# Patient Record
Sex: FEMALE | Race: OTHER | HISPANIC OR LATINO
[De-identification: names, ages, dates, MRNs, and addresses within clinical notes are randomized per-mention and may not be internally consistent; named-entity substitution may affect disease eponyms.]

---

## 2019-08-22 PROBLEM — Z00.00 ENCOUNTER FOR PREVENTIVE HEALTH EXAMINATION: Status: ACTIVE | Noted: 2019-08-22

## 2019-08-30 ENCOUNTER — TRANSCRIPTION ENCOUNTER (OUTPATIENT)
Age: 24
End: 2019-08-30

## 2019-08-30 ENCOUNTER — APPOINTMENT (OUTPATIENT)
Dept: GYNECOLOGIC ONCOLOGY | Facility: CLINIC | Age: 24
End: 2019-08-30
Payer: COMMERCIAL

## 2019-08-30 VITALS
SYSTOLIC BLOOD PRESSURE: 108 MMHG | WEIGHT: 110 LBS | HEIGHT: 61 IN | DIASTOLIC BLOOD PRESSURE: 72 MMHG | BODY MASS INDEX: 20.77 KG/M2

## 2019-08-30 DIAGNOSIS — Z86.19 PERSONAL HISTORY OF OTHER INFECTIOUS AND PARASITIC DISEASES: ICD-10-CM

## 2019-08-30 DIAGNOSIS — Z78.9 OTHER SPECIFIED HEALTH STATUS: ICD-10-CM

## 2019-08-30 DIAGNOSIS — L29.2 PRURITUS VULVAE: ICD-10-CM

## 2019-08-30 DIAGNOSIS — F12.90 CANNABIS USE, UNSPECIFIED, UNCOMPLICATED: ICD-10-CM

## 2019-08-30 PROCEDURE — 99204 OFFICE O/P NEW MOD 45 MIN: CPT

## 2019-08-30 RX ORDER — CLOTRIMAZOLE AND BETAMETHASONE DIPROPIONATE 10; .5 MG/G; MG/G
1-0.05 CREAM TOPICAL TWICE DAILY
Qty: 1 | Refills: 0 | Status: ACTIVE | COMMUNITY
Start: 2019-08-30 | End: 1900-01-01

## 2019-08-30 RX ORDER — TERCONAZOLE 80 MG/1
80 SUPPOSITORY VAGINAL
Qty: 3 | Refills: 0 | Status: ACTIVE | COMMUNITY
Start: 2019-08-30 | End: 1900-01-01

## 2019-08-30 NOTE — CHIEF COMPLAINT
[FreeTextEntry1] : 25 y/o referred from Dr. Vizcarra c/o vulvar pruritis x 2 months, the area around introitos/hymen, described as pale to white. Symptoms began in June. Initially with abnormal discharge, no longer has discharge. Pt is sexually active, using condoms but has not been having any intercourse since June. Pt denies any hx of new soaps/creams at home. \par \par Antifungal steroid creams were not effective. STD work up negative. \par \par GYNHx: none\par OBHx: none\par PMHx:none\par PsHx:none\par Meds:none\par SocialHx: Research Assistant, Single. Drinks ETOH 1-2x/week. Marijuana use (very little, socially). Denies Cigarette use. \par FmHx:Denies hx of CA. \par \par Health maintenance:\par 1. PAP August 2019; normal HPV-

## 2019-08-30 NOTE — PAST MEDICAL HISTORY
[Menarche Age ____] : age at menarche was [unfilled] [Definite ___ (Date)] : the last menstrual period was [unfilled] [Normal Amount/Duration] : it was of a normal amount and duration [Condoms] : uses condoms

## 2019-08-30 NOTE — ASSESSMENT
[FreeTextEntry1] : 24 w/ hypopigmented area to vulvar c/o vulvar pruritis x 2 months. Pruritis associated with the end of her menses and intercourse. Thick candidal like discharge noted today. \par \par My clinically impression is that this is a persistent vaginal candidiasis. Recommend topical treatments for both treatment and symptomatic relief. She may require treatment for a resistant or chronic infection. If no improvement after treatment will consider biopsy of vaginal mucosa, although my suspicion of a dysplastic lesion is low. \par \par [] Rx: clotrimazole, teraconazole\par [] Vaginitis screen\par [] F/U 4 weeks\par

## 2019-08-30 NOTE — HISTORY OF PRESENT ILLNESS
[FreeTextEntry1] : Problem List\par 1) Vulvar pruritis x 2mo, referred by GYN MD to r/o HPV. \par \par Previous Management\par 1) PAP - Neg 8/2/19. GC Neg 8/2/19

## 2019-08-30 NOTE — PHYSICAL EXAM
[Normal] : Recto-Vaginal Exam: Normal [Fully active, able to carry on all pre-disease performance without restriction] : Status 0 - Fully active, able to carry on all pre-disease performance without restriction [de-identified] : mucosa at vaginal introitus hypopigmented, but does not appear to be plaque-like as would be expected with acetowhite changes.

## 2019-09-03 ENCOUNTER — TRANSCRIPTION ENCOUNTER (OUTPATIENT)
Age: 24
End: 2019-09-03

## 2019-09-10 LAB
A VAGINAE DNA VAG QL NAA+PROBE: NORMAL
BVAB2 DNA VAG QL NAA+PROBE: NORMAL
C KRUSEI DNA VAG QL NAA+PROBE: NEGATIVE
C TRACH RRNA SPEC QL NAA+PROBE: NEGATIVE
MEGA1 DNA VAG QL NAA+PROBE: NORMAL
N GONORRHOEA RRNA SPEC QL NAA+PROBE: NEGATIVE
T VAGINALIS RRNA SPEC QL NAA+PROBE: NEGATIVE

## 2019-09-12 ENCOUNTER — APPOINTMENT (OUTPATIENT)
Dept: GYNECOLOGIC ONCOLOGY | Facility: CLINIC | Age: 24
End: 2019-09-12

## 2019-09-20 ENCOUNTER — APPOINTMENT (OUTPATIENT)
Dept: GYNECOLOGIC ONCOLOGY | Facility: CLINIC | Age: 24
End: 2019-09-20

## 2019-11-14 NOTE — REASON FOR VISIT
[FreeTextEntry1] : Pt is here for follow up. Pt was treated for vaginal candidiasis during last visit.

## 2019-11-14 NOTE — PHYSICAL EXAM
[Normal] : Recto-Vaginal Exam: Normal [de-identified] : mucosa at vaginal introitus hypopigmented, but does not appear to be plaque-like as would be expected with acetowhite changes.  [Fully active, able to carry on all pre-disease performance without restriction] : Status 0 - Fully active, able to carry on all pre-disease performance without restriction

## 2019-11-19 ENCOUNTER — APPOINTMENT (OUTPATIENT)
Dept: GYNECOLOGIC ONCOLOGY | Facility: CLINIC | Age: 24
End: 2019-11-19

## 2020-09-05 ENCOUNTER — RESULT REVIEW (OUTPATIENT)
Age: 25
End: 2020-09-05

## 2020-12-21 PROBLEM — Z86.19 HISTORY OF CANDIDIASIS OF VAGINA: Status: RESOLVED | Noted: 2019-08-30 | Resolved: 2020-12-21

## 2021-03-29 ENCOUNTER — APPOINTMENT (OUTPATIENT)
Age: 26
End: 2021-03-29
Payer: COMMERCIAL

## 2021-03-29 PROCEDURE — 0001A: CPT

## 2021-04-19 ENCOUNTER — APPOINTMENT (OUTPATIENT)
Age: 26
End: 2021-04-19
Payer: COMMERCIAL

## 2021-04-19 PROCEDURE — 0002A: CPT

## 2021-09-14 ENCOUNTER — TRANSCRIPTION ENCOUNTER (OUTPATIENT)
Age: 26
End: 2021-09-14

## 2021-09-18 ENCOUNTER — TRANSCRIPTION ENCOUNTER (OUTPATIENT)
Age: 26
End: 2021-09-18

## 2021-12-28 ENCOUNTER — TRANSCRIPTION ENCOUNTER (OUTPATIENT)
Age: 26
End: 2021-12-28

## 2022-03-20 ENCOUNTER — TRANSCRIPTION ENCOUNTER (OUTPATIENT)
Age: 27
End: 2022-03-20

## 2022-07-18 ENCOUNTER — NON-APPOINTMENT (OUTPATIENT)
Age: 27
End: 2022-07-18

## 2022-07-19 ENCOUNTER — NON-APPOINTMENT (OUTPATIENT)
Age: 27
End: 2022-07-19